# Patient Record
Sex: FEMALE | Race: WHITE
[De-identification: names, ages, dates, MRNs, and addresses within clinical notes are randomized per-mention and may not be internally consistent; named-entity substitution may affect disease eponyms.]

---

## 2021-08-06 ENCOUNTER — HOSPITAL ENCOUNTER (OUTPATIENT)
Dept: HOSPITAL 47 - RADMAMWWP | Age: 51
End: 2021-08-06
Attending: SURGERY
Payer: COMMERCIAL

## 2021-08-06 VITALS — SYSTOLIC BLOOD PRESSURE: 121 MMHG | HEART RATE: 73 BPM | DIASTOLIC BLOOD PRESSURE: 85 MMHG

## 2021-08-06 VITALS — RESPIRATION RATE: 16 BRPM | TEMPERATURE: 98.3 F

## 2021-08-06 DIAGNOSIS — N60.12: ICD-10-CM

## 2021-08-06 DIAGNOSIS — N62: Primary | ICD-10-CM

## 2021-08-06 DIAGNOSIS — R92.8: ICD-10-CM

## 2021-08-06 DIAGNOSIS — R92.1: ICD-10-CM

## 2021-08-06 PROCEDURE — 88305 TISSUE EXAM BY PATHOLOGIST: CPT

## 2021-08-06 PROCEDURE — 19081 BX BREAST 1ST LESION STRTCTC: CPT

## 2021-08-09 NOTE — MM
EXAMINATION TYPE: MG stereo VAD BX LT

 

DATE OF EXAM: 8/6/2021

 

COMPARISON: NONE

 

CLINICAL HISTORY: Abnormal mammogram

 

TECHNIQUE: Stereotactic guided core biopsy of left breast.  

 

FINDINGS: The procedure of stereotactic guided core biopsy was explained to the 
patient.  Benefits, alternatives, and risks were discussed.  An informed consent
was then obtained.  

 

The shortness pathway for biopsy was chosen.  Shortness pathway was CCA inferior
approach. Targeting the procedure were performed by Dr. Sullivan. Patient was 
anesthetized with 1% lidocaine. A needle was placed and 7 core samples were 
obtained. Sample was evaluated and has calcifications within the sample.

 

The patient tolerated the procedure well without any immediate complication.  
The patient was kept in the radiology department for short stay after the 
procedure and then discharged home in stable condition.

 

Post biopsy mammogram shows the clip to appear in satisfactory position relative
to the targeted area of concern on the preprocedure images.  

 

IMPRESSION: 

1. Successful core biopsy left breast calcifications.

 

Recommendations:

1. Recommendations are pending pathology results.

 

Pathology Results: Benign



LEFT BREAST, STEREOTACTIC CORE BIOPSY: Fibroadenomatoid hyperplasia with 
calcifications in a background of fibrocystic changes.  



Recommendation

Follow up mammogram of the left breast in 6 months.

EFRAIN

## 2021-08-12 ENCOUNTER — HOSPITAL ENCOUNTER (OUTPATIENT)
Dept: HOSPITAL 47 - WWCWWP | Age: 51
End: 2021-08-12
Attending: SURGERY
Payer: COMMERCIAL

## 2021-08-12 VITALS — TEMPERATURE: 98 F | SYSTOLIC BLOOD PRESSURE: 120 MMHG | DIASTOLIC BLOOD PRESSURE: 85 MMHG

## 2021-08-12 VITALS — HEART RATE: 85 BPM | RESPIRATION RATE: 16 BRPM

## 2021-08-12 DIAGNOSIS — N60.92: Primary | ICD-10-CM

## 2021-08-12 NOTE — P.PN
Subjective


Progress Note Date: 08/12/21


Principal diagnosis: 





Fibroadenomatoid hyperplasia left breast





Jie is a 51-year-old white female who underwent a left breast are detected 

core biopsy and [id number].  Pathology revealed fibroadenomatoid hyperplasia with 

calcifications and the background. 





Objective





- Vital Signs


Vital signs: 


                                   Vital Signs











Temp  98.0 F   08/12/21 11:34


 


Pulse  85   08/12/21 11:34


 


Resp  16   08/12/21 11:34


 


BP  120/85   08/12/21 11:34


 


Pulse Ox      








                                 Intake & Output











 08/11/21 08/12/21 08/12/21





 18:59 06:59 18:59


 


Weight   127.459 kg














- Exam





BMI 46.8





- Constitutional


General appearance: Present: cooperative





- EENT


Eyes: Present: EOMI


ENT: Present: hearing grossly normal





- Respiratory


Respiratory: bilateral: CTA





- Cardiovascular


Rhythm: regular


Heart sounds: normal: S1, S2





- Integumentary


Integumentary Comment(s): 





Biopsy site left breast clean and dry, no evidence of infection or hematoma





- Musculoskeletal


Musculoskeletal: Present: gait normal





- Psychiatric


Psychiatric: Present: A&O x's 3, appropriate affect





Assessment and Plan


Assessment: 





Impression:


Patient status post left breast are detected core biopsy pathology benign





Plan:


1.  Repeat left breast mammogram in 6 months with physician exam at that time





CC: Dr. Willard